# Patient Record
Sex: FEMALE | Race: OTHER | NOT HISPANIC OR LATINO | Employment: UNEMPLOYED | URBAN - NONMETROPOLITAN AREA
[De-identification: names, ages, dates, MRNs, and addresses within clinical notes are randomized per-mention and may not be internally consistent; named-entity substitution may affect disease eponyms.]

---

## 2018-05-18 ENCOUNTER — OFFICE VISIT (OUTPATIENT)
Dept: URGENT CARE | Facility: PHYSICIAN GROUP | Age: 33
End: 2018-05-18

## 2018-05-18 VITALS
DIASTOLIC BLOOD PRESSURE: 74 MMHG | WEIGHT: 114 LBS | HEART RATE: 82 BPM | TEMPERATURE: 100.1 F | SYSTOLIC BLOOD PRESSURE: 124 MMHG | HEIGHT: 60 IN | OXYGEN SATURATION: 98 % | BODY MASS INDEX: 22.38 KG/M2

## 2018-05-18 DIAGNOSIS — H66.92 LEFT OTITIS MEDIA, UNSPECIFIED OTITIS MEDIA TYPE: ICD-10-CM

## 2018-05-18 PROCEDURE — 99204 OFFICE O/P NEW MOD 45 MIN: CPT | Performed by: PHYSICIAN ASSISTANT

## 2018-05-18 RX ORDER — AMOXICILLIN 875 MG/1
875 TABLET, COATED ORAL 2 TIMES DAILY
Qty: 20 TAB | Refills: 0 | Status: SHIPPED | OUTPATIENT
Start: 2018-05-18 | End: 2018-05-29 | Stop reason: SDUPTHER

## 2018-05-19 NOTE — PROGRESS NOTES
Chief Complaint   Patient presents with   • Otalgia     Left ear ache/pressure x1 day [place]        HISTORY OF PRESENT ILLNESS: Patient is a 33 y.o. female who presents today because she has a one-day history of left sided ear pain, no other significant symptoms other than a mild fever, no nausea, vomiting or diarrhea. She has tried to use some over-the-counter holistic medication and surprisingly that has not helped.    Patient Active Problem List    Diagnosis Date Noted   • Female genital symptoms 01/23/2013       Allergies:Nkda [no known drug allergy]    Current Outpatient Prescriptions Ordered in [facility]   Medication Sig Dispense Refill   • amoxicillin (AMOXIL) 875 MG tablet Take 1 Tab by mouth 2 times a day for 10 days. 20 Tab 0   • acyclovir (ZOVIRAX) 400 MG tablet Take 400 mg by mouth every day.       • Norethin-Eth Estrad-Fe Biphas (LO LOESTRIN FE) 1 MG-10 MCG / 10 MCG TABS Take  by mouth every day.       • Glucosamine-Chondroit-Vit C-Mn (GLUCOSAMINE CHONDR 1500 COMPLX) CAPS Take  by mouth every day.       • Multiple Vitamins-Calcium (ONE-A-DAY WOMENS FORMULA) TABS Take  by mouth every day.       • Capsicum, Cayenne, (CAYENNE PO) Take  by mouth every day.       • Misc Natural Products (ECHINACEA GOLDENSEAL PLUS) CAPS Take  by mouth every day.       • Naproxen Sodium (ALEVE PO) Take  by mouth as needed.       • guaifenesin LA (MUCINEX) 600 MG TB12 Take 600 mg by mouth 2 times a day as needed.       • Chlorphen-Phenyleph-ASA (JENNY-SELTZER PLUS COLD PO) Take  by mouth as needed.         No current Epic-ordered facility-administered medications on file.        Past Medical History:   Diagnosis Date   • Allergy    • Anxiety, generalized    • ASTHMA    • Depression    • Other isolated or specific phobias     needle phobia   • Other specified symptom associated with female genital organs    • UTI (lower urinary tract infection)        Social History   Substance Use Topics   • Smoking status: Former Smoker   •  Smokeless tobacco: Never Used   • Alcohol use Yes      Comment: rare       No family status information on file.   History reviewed. No pertinent family history.    ROS:  Review of Systems   Constitutional: Positive for fever, no chills, weight loss and malaise/fatigue.   HENT: Positive for left ear pain, no nosebleeds, congestion, sore throat and neck pain.    Eyes: Negative for blurred vision.   Respiratory: Negative for cough, sputum production, shortness of breath and wheezing.    Cardiovascular: Negative for chest pain, palpitations, orthopnea and leg swelling.   Gastrointestinal: Negative for heartburn, nausea, vomiting and abdominal pain.   Genitourinary: Negative for dysuria, urgency and frequency.     Exam:  Blood pressure 124/74, pulse 82, temperature 37.8 °C (100.1 °F), height 1.524 m (5'), weight 51.7 kg (114 lb), SpO2 98 %, unknown if currently breastfeeding.  General:  Well nourished, well developed female in NAD  Head:Normocephalic, atraumatic  Eyes: PERRLA, EOM within normal limits, no conjunctival injection, no scleral icterus, visual fields and acuity grossly intact.  Ears: Normal shape and symmetry, no tenderness, no discharge. External canals are without any significant edema or erythema. Tympanic membrane on the left is erythematous, bulging and dull, thick fluid behind the tympanic membrane, tympanic membrane on the right is without any inflammation, no effusion. Gross auditory acuity is intact  Nose: Symmetrical without tenderness, no discharge.  Mouth: reasonable hygiene, no erythema exudates or tonsillar enlargement.  Neck: no masses, range of motion within normal limits, no tracheal deviation. No obvious thyroid enlargement.  Pulmonary: chest is symmetrical with respiration, no wheezes, crackles, or rhonchi.  Cardiovascular: regular rate and rhythm without murmurs, rubs, or gallops.  Extremities: no clubbing, cyanosis, or edema.    Please note that this dictation was created using voice  recognition software. I have made every reasonable attempt to correct obvious errors, but I expect that there are errors of grammar and possibly content that I did not discover before finalizing the note.    Assessment/Plan:  1. Left otitis media, unspecified otitis media type  amoxicillin (AMOXIL) 875 MG tablet   , Tylenol or ibuprofen as tolerated.    Followup with primary care in the next 7-10 days if not significantly improving, return to the urgent care or go to the emergency room sooner for any worsening of symptoms.

## 2018-05-29 DIAGNOSIS — H66.92 LEFT OTITIS MEDIA, UNSPECIFIED OTITIS MEDIA TYPE: ICD-10-CM

## 2018-05-29 RX ORDER — AMOXICILLIN 875 MG/1
875 TABLET, COATED ORAL 2 TIMES DAILY
Qty: 20 TAB | Refills: 0 | Status: SHIPPED | OUTPATIENT
Start: 2018-05-29 | End: 2018-06-08

## 2019-06-19 ENCOUNTER — OFFICE VISIT (OUTPATIENT)
Dept: URGENT CARE | Facility: PHYSICIAN GROUP | Age: 34
End: 2019-06-19

## 2019-06-19 ENCOUNTER — HOSPITAL ENCOUNTER (OUTPATIENT)
Facility: MEDICAL CENTER | Age: 34
End: 2019-06-19
Attending: PHYSICIAN ASSISTANT

## 2019-06-19 VITALS
DIASTOLIC BLOOD PRESSURE: 66 MMHG | WEIGHT: 118 LBS | BODY MASS INDEX: 23.16 KG/M2 | HEART RATE: 92 BPM | TEMPERATURE: 98.5 F | SYSTOLIC BLOOD PRESSURE: 118 MMHG | HEIGHT: 60 IN | RESPIRATION RATE: 14 BRPM | OXYGEN SATURATION: 98 %

## 2019-06-19 DIAGNOSIS — R31.9 HEMATURIA, UNSPECIFIED TYPE: ICD-10-CM

## 2019-06-19 LAB
APPEARANCE UR: NORMAL
BILIRUB UR STRIP-MCNC: NORMAL MG/DL
COLOR UR AUTO: NORMAL
GLUCOSE UR STRIP.AUTO-MCNC: NORMAL MG/DL
INT CON NEG: NEGATIVE
INT CON POS: POSITIVE
KETONES UR STRIP.AUTO-MCNC: NORMAL MG/DL
LEUKOCYTE ESTERASE UR QL STRIP.AUTO: NORMAL
NITRITE UR QL STRIP.AUTO: NORMAL
PH UR STRIP.AUTO: 7.5 [PH] (ref 5–8)
POC URINE PREGNANCY TEST: NEGATIVE
PROT UR QL STRIP: NORMAL MG/DL
RBC UR QL AUTO: NORMAL
SP GR UR STRIP.AUTO: 1.01
UROBILINOGEN UR STRIP-MCNC: NORMAL MG/DL

## 2019-06-19 PROCEDURE — 99214 OFFICE O/P EST MOD 30 MIN: CPT | Performed by: PHYSICIAN ASSISTANT

## 2019-06-19 PROCEDURE — 87086 URINE CULTURE/COLONY COUNT: CPT

## 2019-06-19 PROCEDURE — 81025 URINE PREGNANCY TEST: CPT | Performed by: PHYSICIAN ASSISTANT

## 2019-06-19 PROCEDURE — 81002 URINALYSIS NONAUTO W/O SCOPE: CPT | Performed by: PHYSICIAN ASSISTANT

## 2019-06-19 NOTE — PROGRESS NOTES
Chief Complaint   Patient presents with   • UTI       HISTORY OF PRESENT ILLNESS: Patient is a 34 y.o. female who presents today because She has been having some lower abdominal discomfort and blood in her urine.  This is been going on for about 2 weeks.  She had some amoxicillin and she took amoxicillin 500 mg twice daily for 10 days and it seemed to be improving but over the last several days it has returned.  Denies any fevers, chills, nausea, vomiting or diarrhea.    Patient Active Problem List    Diagnosis Date Noted   • Female genital symptoms 01/23/2013       Allergies:Nkda [no known drug allergy]    Current Outpatient Prescriptions Ordered in [facility]   Medication Sig Dispense Refill   • acyclovir (ZOVIRAX) 400 MG tablet Take 400 mg by mouth every day.       • Norethin-Eth Estrad-Fe Biphas (LO LOESTRIN FE) 1 MG-10 MCG / 10 MCG TABS Take  by mouth every day.       • Glucosamine-Chondroit-Vit C-Mn (GLUCOSAMINE CHONDR 1500 COMPLX) CAPS Take  by mouth every day.       • Multiple Vitamins-Calcium (ONE-A-DAY WOMENS FORMULA) TABS Take  by mouth every day.       • Capsicum, Cayenne, (CAYENNE PO) Take  by mouth every day.       • Misc Natural Products (ECHINACEA GOLDENSEAL PLUS) CAPS Take  by mouth every day.       • Naproxen Sodium (ALEVE PO) Take  by mouth as needed.       • guaifenesin LA (MUCINEX) 600 MG TB12 Take 600 mg by mouth 2 times a day as needed.       • Chlorphen-Phenyleph-ASA (JENNY-SELTZER PLUS COLD PO) Take  by mouth as needed.         No current Epic-ordered facility-administered medications on file.        Past Medical History:   Diagnosis Date   • Allergy    • Anxiety, generalized    • ASTHMA    • Depression    • Other isolated or specific phobias     needle phobia   • Other specified symptom associated with female genital organs    • UTI (lower urinary tract infection)        Social History   Substance Use Topics   • Smoking status: Former Smoker   • Smokeless tobacco: Never Used   • Alcohol use Yes       Comment: rare       No family status information on file.   No family history on file.    ROS:  Review of Systems   Constitutional: Negative for fever, chills, weight loss and malaise/fatigue.   HENT: Negative for ear pain, nosebleeds, congestion, sore throat and neck pain.    Eyes: Negative for blurred vision.   Respiratory: Negative for cough, sputum production, shortness of breath and wheezing.    Cardiovascular: Negative for chest pain, palpitations, orthopnea and leg swelling.   Gastrointestinal: Negative for heartburn, nausea, vomiting and positive for mild lower abdominal pain.   Genitourinary: Negative for dysuria, urgency and frequency.  Positive for blood    Exam:  /66 (BP Location: Right arm, Patient Position: Sitting, BP Cuff Size: Small adult)   Pulse 92   Temp 36.9 °C (98.5 °F) (Temporal)   Resp 14   Ht 1.524 m (5')   Wt 53.5 kg (118 lb)   SpO2 98%   General:  Well nourished, well developed female in NAD  Head:Normocephalic, atraumatic  Eyes: PERRLA, EOM within normal limits, no conjunctival injection, no scleral icterus, visual fields and acuity grossly intact.  Nose: Symmetrical without tenderness, no discharge.  Mouth: reasonable hygiene, no erythema exudates or tonsillar enlargement.  Neck: no masses, range of motion within normal limits, no tracheal deviation. No obvious thyroid enlargement.  Pulmonary: chest is symmetrical with respiration, no wheezes, crackles, or rhonchi.  Cardiovascular: regular rate and rhythm without murmurs, rubs, or gallops.  Extremities: no clubbing, cyanosis, or edema.    hCG negative, urinalysis unremarkable with exception of blood    Please note that this dictation was created using voice recognition software. I have made every reasonable attempt to correct obvious errors, but I expect that there are errors of grammar and possibly content that I did not discover before finalizing the note.    Assessment/Plan:  1. Hematuria, unspecified type  POCT  Urinalysis    Urine Culture    POCT PREGNANCY   Will notify the patient of the culture results.    Followup with primary care in the next 7-10 days if not significantly improving, return to the urgent care or go to the emergency room sooner for any worsening of symptoms.

## 2019-06-21 LAB
BACTERIA UR CULT: NORMAL
SIGNIFICANT IND 70042: NORMAL
SITE SITE: NORMAL
SOURCE SOURCE: NORMAL

## 2019-06-24 ENCOUNTER — TELEPHONE (OUTPATIENT)
Dept: URGENT CARE | Facility: PHYSICIAN GROUP | Age: 34
End: 2019-06-24